# Patient Record
Sex: MALE | Race: WHITE | Employment: FULL TIME | URBAN - METROPOLITAN AREA
[De-identification: names, ages, dates, MRNs, and addresses within clinical notes are randomized per-mention and may not be internally consistent; named-entity substitution may affect disease eponyms.]

---

## 2017-03-30 ENCOUNTER — OFFICE VISIT (OUTPATIENT)
Dept: FAMILY MEDICINE CLINIC | Facility: CLINIC | Age: 27
End: 2017-03-30

## 2017-03-30 VITALS
DIASTOLIC BLOOD PRESSURE: 80 MMHG | SYSTOLIC BLOOD PRESSURE: 162 MMHG | BODY MASS INDEX: 41.52 KG/M2 | HEART RATE: 109 BPM | OXYGEN SATURATION: 98 % | RESPIRATION RATE: 16 BRPM | HEIGHT: 70 IN | TEMPERATURE: 98 F | WEIGHT: 290 LBS

## 2017-03-30 DIAGNOSIS — H66.002 ACUTE SUPPURATIVE OTITIS MEDIA OF LEFT EAR WITHOUT SPONTANEOUS RUPTURE OF TYMPANIC MEMBRANE, RECURRENCE NOT SPECIFIED: Primary | ICD-10-CM

## 2017-03-30 DIAGNOSIS — R03.0 ELEVATED BLOOD PRESSURE READING: ICD-10-CM

## 2017-03-30 DIAGNOSIS — J06.9 VIRAL UPPER RESPIRATORY TRACT INFECTION: ICD-10-CM

## 2017-03-30 PROCEDURE — 99213 OFFICE O/P EST LOW 20 MIN: CPT | Performed by: NURSE PRACTITIONER

## 2017-03-30 RX ORDER — AMOXICILLIN 875 MG/1
875 TABLET, COATED ORAL 2 TIMES DAILY
Qty: 20 TABLET | Refills: 0 | Status: SHIPPED | OUTPATIENT
Start: 2017-03-30 | End: 2017-04-09

## 2017-03-30 NOTE — PROGRESS NOTES
CHIEF COMPLAINT:   Patient presents with:  Cough: dry persistent cough, sneezing, drainage and body aches x 3 days. HPI:   Lory Mcclure is a 23year old male who presents to clinic today with complaints of left ear pain, congestion.  Has had for /80 mmHg  Pulse 109  Temp(Src) 98.2 °F (36.8 °C)  Resp 16  Ht 70\"  Wt 290 lb  BMI 41.61 kg/m2  SpO2 98%  GENERAL: well developed, well nourished,in no apparent distress  SKIN: no rashes,no suspicious lesions  HEAD: atraumatic, normocephalic  EYES: c Viral upper respiratory tract infection    Elevated blood pressure reading      Meds & Refills for this Visit:    Signed Prescriptions Disp Refills    amoxicillin 875 MG Oral Tab 20 tablet 0      Sig: Take 1 tablet (875 mg total) by mouth 2 (two) times christos · Ear pain gets worse or does not improve after 3 days of treatment  · Unusual drowsiness or confusion  · Neck pain, stiff neck, or headache  · Fluid or blood draining from the ear canal  · Fever of 100.4°F (38°C) or as advised   · Seizure  Date Last Revie

## 2017-10-17 ENCOUNTER — OFFICE VISIT (OUTPATIENT)
Dept: OTHER | Facility: HOSPITAL | Age: 27
End: 2017-10-17
Attending: ORTHOPAEDIC SURGERY

## 2017-10-17 DIAGNOSIS — Z00.00 ROUTINE GENERAL MEDICAL EXAMINATION AT A HEALTH CARE FACILITY: Primary | ICD-10-CM

## 2017-10-17 PROCEDURE — 86706 HEP B SURFACE ANTIBODY: CPT

## 2018-07-17 ENCOUNTER — TELEPHONE (OUTPATIENT)
Dept: FAMILY MEDICINE CLINIC | Facility: CLINIC | Age: 28
End: 2018-07-17

## 2018-07-17 NOTE — TELEPHONE ENCOUNTER
Patient advised of immunizations in Epic. He will go to his previous school for a complete record of immunizations and bring to office to enter in [place].

## 2018-07-17 NOTE — TELEPHONE ENCOUNTER
Looking for additional immunization records he had from [place]. Stated he needs all his prior vaccinations not just recent ones.

## 2019-02-02 ENCOUNTER — OFFICE VISIT (OUTPATIENT)
Dept: OCCUPATIONAL MEDICINE | Age: 29
End: 2019-02-02
Attending: EMERGENCY MEDICINE
Payer: COMMERCIAL

## 2019-02-02 DIAGNOSIS — Z00.00 ROUTINE GENERAL MEDICAL EXAMINATION AT A HEALTH CARE FACILITY: Primary | ICD-10-CM

## 2019-02-02 PROCEDURE — 86480 TB TEST CELL IMMUN MEASURE: CPT

## 2019-02-04 LAB
M TB IFN-G CD4+ BCKGRND COR BLD-ACNC: 0.02 IU/ML
M TB IFN-G CD4+ T-CELLS BLD-ACNC: 0.24 IU/ML
M TB TUBERC IFN-G BLD QL: NEGATIVE
M TB TUBERC IGNF/MITOGEN IGNF CONTROL: 9.7 IU/ML

## 2020-04-01 ENCOUNTER — APPOINTMENT (OUTPATIENT)
Dept: GENERAL RADIOLOGY | Age: 30
End: 2020-04-01
Attending: EMERGENCY MEDICINE
Payer: COMMERCIAL

## 2020-04-01 ENCOUNTER — HOSPITAL ENCOUNTER (EMERGENCY)
Age: 30
Discharge: HOME | End: 2020-04-01
Attending: EMERGENCY MEDICINE
Payer: COMMERCIAL

## 2020-04-01 VITALS
OXYGEN SATURATION: 100 % | TEMPERATURE: 98 F | SYSTOLIC BLOOD PRESSURE: 133 MMHG | RESPIRATION RATE: 20 BRPM | DIASTOLIC BLOOD PRESSURE: 88 MMHG | HEIGHT: 71 IN | WEIGHT: 315 LBS | HEART RATE: 73 BPM | BODY MASS INDEX: 44.1 KG/M2

## 2020-04-01 DIAGNOSIS — J22 LOWER RESP. TRACT INFECTION: Primary | ICD-10-CM

## 2020-04-01 PROCEDURE — 99284 EMERGENCY DEPT VISIT MOD MDM: CPT

## 2020-04-01 PROCEDURE — 71045 X-RAY EXAM CHEST 1 VIEW: CPT | Performed by: EMERGENCY MEDICINE

## 2020-04-01 RX ORDER — AZITHROMYCIN 250 MG/1
TABLET, FILM COATED ORAL
Qty: 1 PACKAGE | Refills: 0 | Status: SHIPPED | OUTPATIENT
Start: 2020-04-01 | End: 2020-04-06

## 2020-04-01 NOTE — ED INITIAL ASSESSMENT (HPI)
REPORTS ON Sunday HAD A FEVER OF ABOUT 100 WITH DIZZINESS AND NAUSEA. Monday HAD TEMP OF 99.  IS ON AN AMBULANCE THAT HAS BEEN TRANSFERING COVID PTS. STATES HIS PARTNER GOT SICK TOO BUT NEVER GOT TESTED.   RIGHT NOW WITHOUT FEVER AND NO COUGH BUT \"I'M JU

## 2020-04-02 ENCOUNTER — TELEPHONE (OUTPATIENT)
Dept: FAMILY MEDICINE CLINIC | Facility: CLINIC | Age: 30
End: 2020-04-02

## 2020-04-02 NOTE — TELEPHONE ENCOUNTER
Spoke with patient. Result negative. Doing better. No fever. Symptoms improving. Taking the antibiotics as prescribed. He has the next 4 days off to rest then plans to go back to work.

## 2020-04-02 NOTE — TELEPHONE ENCOUNTER
Damian Moreno, DO Damian Moreno Nurse 3 hours ago (9:06 AM)      Please place recall in epic for us to call patient for update on symptoms in 1 week. 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       Recall

## 2020-04-09 NOTE — TELEPHONE ENCOUNTER
Pt called back, informed me he is doing well. Did complete antibiotics and denies fever, cough, runny nose, nausea, vomiting. Did not have any concerns at this time.

## 2024-10-22 ENCOUNTER — PATIENT OUTREACH (OUTPATIENT)
Dept: FAMILY MEDICINE CLINIC | Facility: CLINIC | Age: 34
End: 2024-10-22

## 2025-04-01 NOTE — ED PROVIDER NOTES
I would not recommend jumping to a medication change of the soon after discharge.  Can certainly be forwarded on to cardiology for their opinion but I would continue taking medications as they prescribed them and discuss at upcoming visit next week.   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   Nevert °C) (Oral)   Resp 20   Ht 180.3 cm (5' 11\")   Wt (!) 142.9 kg   SpO2 99%   BMI 43.93 kg/m²         Physical Exam  Appears to be generally healthy and resting comfortably. Afebrile and nontoxic in appearance.   Skin: Warm and dry, without cyanosis, pallor,

## (undated) NOTE — MR AVS SNAPSHOT
[address]  [address]  [address] [id number]  [phone]               Thank you for choosing us for your health care visit with CAMILA Ledesma.   We are glad to serve you and happy to provide you with this summary of stomach ulcer or gastrointestinal bleeding, talk with your healthcare provider before using these medicines. Do not give aspirin to anyone under 25years of age who has a fever. It may cause severe illness or death.   Follow-up care  Follow up with your hea medical emergencies, dial 911. Visit [email or web address] to learn more. Educational Information     Your blood pressure indicates you may be at-risk for Hypertension. Please consider the following Lifestyle Modifications.   Also, please are inactive.      HOW TO GET STARTED: HOW TO STAY MOTIVATED:   Start activities slowly and build up over time Do what you like   Get your heart pumping – brisk walking, biking, swimming Even 10 minute increments are effective and add up over the week   2 ½